# Patient Record
Sex: FEMALE | Race: WHITE
[De-identification: names, ages, dates, MRNs, and addresses within clinical notes are randomized per-mention and may not be internally consistent; named-entity substitution may affect disease eponyms.]

---

## 2017-01-11 ENCOUNTER — HOSPITAL ENCOUNTER (OUTPATIENT)
Dept: HOSPITAL 47 - RADMAMWWP | Age: 52
End: 2017-01-11
Attending: FAMILY MEDICINE
Payer: COMMERCIAL

## 2017-01-11 DIAGNOSIS — Z12.31: Primary | ICD-10-CM

## 2017-01-12 NOTE — MM
Reason for exam: screening  (asymptomatic).

Last mammogram was performed 1 year ago.



History:

Patient is postmenopausal and history of other cancer.

Family history of breast cancer in paternal aunt at age 80 and breast cancer in 

paternal grandmother at age 60.



Physical Findings:

A clinical breast exam by your physician is recommended on an annual basis and 

results should be correlated with mammographic findings.



MG Screening Mammo w CAD

Bilateral CC and MLO view(s) were taken.

Prior study comparison: December 28, 2015, bilateral MG 3d screening mammo w/cad. 

October 28, 2014, bilateral MG screening mammo w CAD.  September 30, 2013, 

bilateral digital screening mammo w/CAD.

There are scattered fibroglandular densities.  No significant changes when 

compared with prior studies.





ASSESSMENT: Negative, BI-RAD 1



RECOMMENDATION:

Routine screening mammogram of both breasts in 1 year.

## 2018-03-28 ENCOUNTER — HOSPITAL ENCOUNTER (OUTPATIENT)
Dept: HOSPITAL 47 - RADMAMWWP | Age: 53
Discharge: HOME | End: 2018-03-28
Attending: INTERNAL MEDICINE
Payer: COMMERCIAL

## 2018-03-28 DIAGNOSIS — Z12.31: Primary | ICD-10-CM

## 2018-03-28 PROCEDURE — 77067 SCR MAMMO BI INCL CAD: CPT

## 2018-03-30 NOTE — MM
Reason for exam: screening  (asymptomatic).

Last mammogram was performed 1 year and 3 months ago.



History:

Patient is postmenopausal and has history of other cancer at age 48.

Family history of breast cancer in paternal aunt at age 80.



Physical Findings:

A clinical breast exam by your physician is recommended on an annual basis and 

results should be correlated with mammographic findings.



MG Screening Mammo w CAD

Bilateral CC and MLO view(s) were taken.

Prior study comparison: January 11, 2017, bilateral MG screening mammo w CAD.  

December 28, 2015, bilateral MG 3d screening mammo w/cad.

There are scattered fibroglandular densities.  No significant changes when 

compared with prior studies.





ASSESSMENT: Negative, BI-RAD 1



RECOMMENDATION:

Routine screening mammogram of both breasts in 1 year.

## 2019-07-08 ENCOUNTER — HOSPITAL ENCOUNTER (OUTPATIENT)
Dept: HOSPITAL 47 - RADMAMWWP | Age: 54
Discharge: HOME | End: 2019-07-08
Attending: FAMILY MEDICINE
Payer: COMMERCIAL

## 2019-07-08 DIAGNOSIS — Z12.31: Primary | ICD-10-CM

## 2019-07-08 PROCEDURE — 77067 SCR MAMMO BI INCL CAD: CPT

## 2019-07-10 NOTE — MM
Reason for exam: screening  (asymptomatic).

Last mammogram was performed 1 year and 3 months ago.



History:

Patient is postmenopausal and has history of other cancer at age 48.

Family history of breast cancer in paternal aunt at age 80.



Physical Findings:

A clinical breast exam by your physician is recommended on an annual basis and 

results should be correlated with mammographic findings.



MG Screening Mammo w CAD

Bilateral CC and MLO view(s) were taken.

Prior study comparison: March 28, 2018, bilateral MG screening mammo w CAD.  

January 11, 2017, bilateral MG screening mammo w CAD.

There are scattered fibroglandular densities.  No significant changes when 

compared with prior studies.





ASSESSMENT: Negative, BI-RAD 1



RECOMMENDATION:

Routine screening mammogram of both breasts in 1 year.

## 2020-06-27 ENCOUNTER — HOSPITAL ENCOUNTER (EMERGENCY)
Dept: HOSPITAL 47 - EC | Age: 55
LOS: 1 days | Discharge: HOME | End: 2020-06-28
Payer: COMMERCIAL

## 2020-06-27 VITALS
HEART RATE: 118 BPM | DIASTOLIC BLOOD PRESSURE: 85 MMHG | RESPIRATION RATE: 18 BRPM | SYSTOLIC BLOOD PRESSURE: 164 MMHG | TEMPERATURE: 98.6 F

## 2020-06-27 DIAGNOSIS — Z91.040: ICD-10-CM

## 2020-06-27 DIAGNOSIS — Z88.1: ICD-10-CM

## 2020-06-27 DIAGNOSIS — H53.8: ICD-10-CM

## 2020-06-27 DIAGNOSIS — Z85.42: ICD-10-CM

## 2020-06-27 DIAGNOSIS — H43.391: Primary | ICD-10-CM

## 2020-06-27 DIAGNOSIS — Z88.5: ICD-10-CM

## 2020-06-27 DIAGNOSIS — H57.11: ICD-10-CM

## 2020-06-27 DIAGNOSIS — Z86.69: ICD-10-CM

## 2020-06-27 PROCEDURE — 99284 EMERGENCY DEPT VISIT MOD MDM: CPT

## 2020-06-28 NOTE — ED
Eye Problem HPI





- General


Chief complaint: Eye Problems


Stated complaint: Eye Problems


Time Seen by Provider: 06/27/20 23:25


Source: patient, RN notes reviewed, old records reviewed


Mode of arrival: ambulatory


Limitations: no limitations





- History of Present Illness


Initial comments: 





This is a 55-year-old female DF for evaluation.  Patient presents today for 

evaluation of blurry vision and vitreous floaters only her right eye.  History 

of similar symptoms are less his vitreal tear suffers from [place] glaucoma 

denies eye pain or trauma.  Patient has been taking medications as appropriately


MD chief complaint: eye pain (No eye pain patient does have for his in the right

eye)


-: hour(s)


Onset Description: gradual


Location: right eye


Place: home


If Injury: none


Severity: mild


Consistency: now resolved


Associated Symptoms: none


Treatments Prior to Arrival: none





- Related Data


                                    Allergies











Allergy/AdvReac Type Severity Reaction Status Date / Time


 


azithromycin [From Zithromax] Allergy  Vomiting Verified 06/27/20 22:35


 


codeine Allergy  Vomiting Verified 06/27/20 22:35


 


latex Allergy  Rash/Hives Verified 06/27/20 22:35














Review of Systems


ROS Statement: 


Those systems with pertinent positive or pertinent negative responses have been 

documented in the HPI.





ROS Other: All systems not noted in ROS Statement are negative.





Past Medical History


Additional Past Medical History / Comment(s): occular hypertension, uterine CA


History of Any Multi-Drug Resistant Organisms: None Reported


Past Surgical History: Hysterectomy


Past Psychological History: Anxiety


Smoking Status: Never smoker


Past Alcohol Use History: None Reported


Past Drug Use History: None Reported





General Exam


Limitations: no limitations


General appearance: alert, in no apparent distress


Head exam: Present: atraumatic, normocephalic, normal inspection


Eye exam: Present: normal appearance, PERRL, EOMI.  Absent: scleral icterus, 

conjunctival injection, periorbital swelling


ENT exam: Present: normal exam, mucous membranes moist


Neck exam: Present: normal inspection.  Absent: tenderness, meningismus, 

lymphadenopathy


Respiratory exam: Present: normal lung sounds bilaterally.  Absent: respiratory 

distress, wheezes, rales, rhonchi, stridor


Cardiovascular Exam: Present: normal rhythm, tachycardia, normal heart sounds.  

Absent: systolic murmur, diastolic murmur, rubs, gallop, clicks


GI/Abdominal exam: Present: soft, normal bowel sounds.  Absent: distended, 

tenderness, guarding, rebound, rigid


Extremities exam: Present: normal inspection, full ROM, normal capillary refill.

 Absent: tenderness, pedal edema, joint swelling, calf tenderness


Back exam: Present: normal inspection


Neurological exam: Present: alert, oriented X3, CN II-XII intact


Psychiatric exam: Present: normal affect, normal mood


Skin exam: Present: warm, dry, intact, normal color.  Absent: rash





Course


                                   Vital Signs











  06/27/20





  22:32


 


Temperature 98.6 F


 


Pulse Rate 118 H


 


Respiratory 18





Rate 


 


Blood Pressure 164/85


 


O2 Sat by Pulse 99





Oximetry 














- Reevaluation(s)


Reevaluation #1: 





Medical records reviewed





Symptoms remain resolved








- Consultations


Consultation #1: 





Spoke with ophthalmology on-call will see patient in office on Monday





Medical Decision Making





- Medical Decision Making





55 female vitreous floaters the right eye history of vitreous tear left eye 

patient will follow-up with ophthalmology on Monday for further evaluation 

symptoms are now resolved





Disposition


Clinical Impression: 


 Vitreous floaters of right eye





Disposition: HOME SELF-CARE


Instructions (If sedation given, give patient instructions):  Visual Floaters 

(ED)


Is patient prescribed a controlled substance at d/c from ED?: No


Referrals: 


Ac Beck MD [STAFF PHYSICIAN] - 1-2 days


Rolly Shen MD [STAFF PHYSICIAN] - 1-2 days

## 2020-11-11 ENCOUNTER — HOSPITAL ENCOUNTER (OUTPATIENT)
Dept: HOSPITAL 47 - RADMAMWWP | Age: 55
Discharge: HOME | End: 2020-11-11
Attending: FAMILY MEDICINE
Payer: COMMERCIAL

## 2020-11-11 DIAGNOSIS — Z12.31: Primary | ICD-10-CM

## 2020-11-11 PROCEDURE — 77067 SCR MAMMO BI INCL CAD: CPT

## 2020-11-12 NOTE — MM
Reason for exam: screening  (asymptomatic).

Last mammogram was performed 1 year and 4 months ago.



History:

Patient is postmenopausal and has history of other cancer at age 48.

Family history of breast cancer in paternal aunt at age 80.



Physical Findings:

A clinical breast exam by your physician is recommended on an annual basis and 

results should be correlated with mammographic findings.



MG Screening Mammo w CAD

Bilateral CC and MLO view(s) were taken.

Prior study comparison: July 8, 2019, bilateral MG screening mammo w CAD.  March 28, 2018, bilateral MG screening mammo w CAD.

There are scattered fibroglandular densities.  There is no discrete abnormality.





ASSESSMENT: Negative, BI-RAD 1



RECOMMENDATION:

Routine screening mammogram of both breasts in 1 year.

## 2023-03-30 ENCOUNTER — HOSPITAL ENCOUNTER (OUTPATIENT)
Dept: HOSPITAL 47 - RADMAMWWP | Age: 58
Discharge: HOME | End: 2023-03-30
Payer: COMMERCIAL

## 2023-03-30 DIAGNOSIS — Z80.3: ICD-10-CM

## 2023-03-30 DIAGNOSIS — Z78.0: ICD-10-CM

## 2023-03-30 DIAGNOSIS — Z12.31: Primary | ICD-10-CM

## 2023-03-30 PROCEDURE — 77067 SCR MAMMO BI INCL CAD: CPT

## 2023-04-03 NOTE — MM
Reason for Exam: Screening  (asymptomatic). 

Last mammogram was performed 2 year(s) and 4 month(s) ago. 





Patient History: 

Menarche at age 8. First Full-Term Pregnancy at age 22. Left ovary removed at age 48. Right ovary

removed at age 48. Hysterectomy at age 48. Postmenopausal. Other cancer, age 48.

Paternal aunt had breast cancer, age 80. 





Risk Values: 

Paola 5 year model risk: 1.4%.

NCI Lifetime model risk: 7.2%.





Prior Study Comparison: 

3/28/2018 Bilateral Screening Mammogram, [facility]. 7/8/2019 Bilateral Screening Mammogram, [facility].

11/11/2020 Bilateral Screening Mammogram, [facility]. 





Tissue Density: 

There are scattered fibroglandular densities.





Findings: 

Analyzed By CAD. 

Abdomen appears symmetrical and stable. No significant interval change is evident.

No suspicious groups of microcalcifications, spiculated or lobular masses, architectural distortion

or other secondary signs of malignancy are mammographically apparent. 





Overall Assessment: Benign, BI-RAD 2





Management: 

Screening Mammogram of both breasts in 1 year.

A negative mammogram report should not preclude additional follow up of suspicious palpable

abnormalities.

Patient should continue monthly self breast exam.

A clinical breast exam by your physician is recommended on an annual basis and results should be

correlated with mammographic findings.



Electronically signed and approved by: Leopold M. Fregoli, M.D. Radiologis

## 2024-10-14 ENCOUNTER — HOSPITAL ENCOUNTER (OUTPATIENT)
Dept: HOSPITAL 47 - RADMAMWWP | Age: 59
Discharge: HOME | End: 2024-10-14
Attending: FAMILY MEDICINE
Payer: COMMERCIAL

## 2024-10-14 PROCEDURE — 77067 SCR MAMMO BI INCL CAD: CPT

## 2024-10-14 NOTE — MM
Reason for Exam: Screening  (asymptomatic). 

Last mammogram was performed 1 year(s) and 7 month(s) ago. 





Patient History: 

Menarche at age 8. First Full-Term Pregnancy at age 22. Left ovary removed at age 48. Right ovary

removed at age 48. Hysterectomy at age 48. Postmenopausal. Other cancer, age 48.

Paternal aunt had breast cancer, age 80. 





Risk Values: 

Paola 5 year model risk: 1.5%.

NCI Lifetime model risk: 7.0%.





Prior Study Comparison: 

7/8/2019 Bilateral Screening Mammogram, [facility]. 11/11/2020 Bilateral Screening Mammogram, [facility].

3/30/2023 Bilateral MG screening mammo w CAD, [facility]. 





Tissue Density: 

The breasts are heterogeneously dense, which may obscure small masses.





Findings: 

Analyzed By CAD. 

There is no suspicious group of microcalcifications or new suspicious mass in either breast. Tiny

calcifications noted. 





Overall Assessment: Benign, BI-RAD 2





Management: 

Screening Mammogram of both breasts in 1 year.

.



Patient should continue monthly self-breast exams.  A clinical breast exam by your physician is

recommended on an annual basis.

This exam should not preclude additional follow-up of suspicious palpable abnormalities.



Note on Paola scores and lifetime risk:

1. A Paola score greater than 3% is considered moderate risk. If this is the case, consider

specialist referral to assess eligibility for a risk reducing agent.

2. If overall lifetime risk for the development of breast cancer is 20% or higher, the patient may

qualify for future screening with alternating mammogram and breast MRI.









[facility], Workstation: [id number], 10/14/2024 8:42 AM.



Electronically signed and approved by: Marcos Mcgill M.D. Radiologis